# Patient Record
Sex: MALE | Race: WHITE | NOT HISPANIC OR LATINO | URBAN - METROPOLITAN AREA
[De-identification: names, ages, dates, MRNs, and addresses within clinical notes are randomized per-mention and may not be internally consistent; named-entity substitution may affect disease eponyms.]

---

## 2018-09-05 ENCOUNTER — EMERGENCY (EMERGENCY)
Facility: HOSPITAL | Age: 63
LOS: 1 days | Discharge: HOME | End: 2018-09-05
Attending: EMERGENCY MEDICINE

## 2018-09-05 VITALS
SYSTOLIC BLOOD PRESSURE: 224 MMHG | HEART RATE: 80 BPM | OXYGEN SATURATION: 100 % | TEMPERATURE: 96 F | DIASTOLIC BLOOD PRESSURE: 104 MMHG | RESPIRATION RATE: 20 BRPM | WEIGHT: 250 LBS

## 2018-09-05 VITALS — HEART RATE: 58 BPM | DIASTOLIC BLOOD PRESSURE: 104 MMHG | SYSTOLIC BLOOD PRESSURE: 224 MMHG

## 2018-09-05 DIAGNOSIS — M79.605 PAIN IN LEFT LEG: ICD-10-CM

## 2018-09-05 DIAGNOSIS — I10 ESSENTIAL (PRIMARY) HYPERTENSION: ICD-10-CM

## 2018-09-05 DIAGNOSIS — E11.9 TYPE 2 DIABETES MELLITUS WITHOUT COMPLICATIONS: ICD-10-CM

## 2018-09-05 LAB — D DIMER BLD IA.RAPID-MCNC: 54 NG/ML DDU — SIGNIFICANT CHANGE UP (ref 0–230)

## 2018-09-05 RX ORDER — METHOCARBAMOL 500 MG/1
1000 TABLET, FILM COATED ORAL ONCE
Qty: 0 | Refills: 0 | Status: COMPLETED | OUTPATIENT
Start: 2018-09-05 | End: 2018-09-05

## 2018-09-05 RX ORDER — METHOCARBAMOL 500 MG/1
2 TABLET, FILM COATED ORAL
Qty: 24 | Refills: 0 | OUTPATIENT
Start: 2018-09-05 | End: 2018-09-08

## 2018-09-05 RX ORDER — OXYCODONE AND ACETAMINOPHEN 5; 325 MG/1; MG/1
1 TABLET ORAL ONCE
Qty: 0 | Refills: 0 | Status: DISCONTINUED | OUTPATIENT
Start: 2018-09-05 | End: 2018-09-05

## 2018-09-05 RX ORDER — KETOROLAC TROMETHAMINE 30 MG/ML
30 SYRINGE (ML) INJECTION ONCE
Qty: 0 | Refills: 0 | Status: DISCONTINUED | OUTPATIENT
Start: 2018-09-05 | End: 2018-09-05

## 2018-09-05 RX ADMIN — OXYCODONE AND ACETAMINOPHEN 1 TABLET(S): 5; 325 TABLET ORAL at 03:10

## 2018-09-05 RX ADMIN — METHOCARBAMOL 1000 MILLIGRAM(S): 500 TABLET, FILM COATED ORAL at 03:10

## 2018-09-05 RX ADMIN — Medication 30 MILLIGRAM(S): at 02:07

## 2018-09-05 NOTE — ED ADULT NURSE NOTE - NSIMPLEMENTINTERV_GEN_ALL_ED
Implemented All Universal Safety Interventions:  [place] to call system. Call bell, personal items and telephone within reach. Instruct patient to call for assistance. Room bathroom lighting operational. Non-slip footwear when patient is off stretcher. Physically safe environment: no spills, clutter or unnecessary equipment. Stretcher in lowest position, wheels locked, appropriate side rails in place.

## 2018-09-05 NOTE — ED PROVIDER NOTE - ATTENDING CONTRIBUTION TO CARE
I personally evaluated the patient. I reviewed the Resident’s or Physician Assistant’s note (as assigned above), and agree with the findings and plan except as documented in my note.    62 y/o M with PMHX of HTN, DM presents to the ED with left LLE pain x 3 days. Pain starts in the calf and goes up left leg. Pt denies fever, chills, chest pain, SOB, recent long distance travels, recent surgery. No n/v.    Plan: D dimer to r/o DVT (although unlikely), reassess. Likely muskoloskeletal pain in nature

## 2018-09-05 NOTE — ED ADULT NURSE NOTE - CHPI ED NUR SYMPTOMS NEG
no back pain/no deformity/no bruising/no tingling/no numbness/no stiffness/no difficulty bearing weight/no fever/no weakness/no abrasion

## 2018-09-05 NOTE — ED PROVIDER NOTE - OBJECTIVE STATEMENT
62 yo M with PMHx of DM and HTN presents to the ED c/o left LE pain x 3 days. Pain starts in the calf and goes up left leg. Pt has been taking Motrin without relief of symptoms. Pt denies fever, chills, chest pain, SOB, recent travel, recent surgery.

## 2018-09-05 NOTE — ED ADULT TRIAGE NOTE - CHIEF COMPLAINT QUOTE
Pt came c/o left leg pain, pt stated it feels like a muscle spasm that started on Saturday. Pt went to his doctor who sent him to physical therapy but it is not helping, he also took Ibuprofen with no relief, denies any trauma.

## 2018-09-05 NOTE — ED PROVIDER NOTE - PHYSICAL EXAMINATION
VITAL SIGNS: I have reviewed nursing notes and confirm.  CONSTITUTIONAL: Well-developed; well-nourished; in no acute distress.  SKIN: Skin exam is warm and dry, no acute rash.  HEAD: Normocephalic; atraumatic.  NECK: Supple; non tender.  CARD: S1, S2 normal; no murmurs, gallops, or rubs. Regular rate and rhythm.  RESP: No wheezes, rales or rhonchi. Speaking in full sentences.   EXT: Normal ROM. No clubbing, cyanosis or edema. No calf TTP. DP 2+ B/L.   NEURO: Alert, oriented. Grossly unremarkable. No focal deficits.

## 2022-07-29 NOTE — ED ADULT TRIAGE NOTE - CCCP TRG CHIEF CMPLNT
Called patient and informed of unviewed test results in patient portal. Instructed patient to review results and result note and call us with any questions. Patient verbalized understanding and had no questions at this time.   pain, upper leg